# Patient Record
Sex: MALE | Race: WHITE | Employment: FULL TIME | URBAN - METROPOLITAN AREA
[De-identification: names, ages, dates, MRNs, and addresses within clinical notes are randomized per-mention and may not be internally consistent; named-entity substitution may affect disease eponyms.]

---

## 2017-04-28 ENCOUNTER — OFFICE VISIT (OUTPATIENT)
Dept: CARDIOLOGY CLINIC | Age: 57
End: 2017-04-28

## 2017-04-28 VITALS
HEIGHT: 70 IN | WEIGHT: 234 LBS | OXYGEN SATURATION: 95 % | HEART RATE: 94 BPM | DIASTOLIC BLOOD PRESSURE: 94 MMHG | BODY MASS INDEX: 33.5 KG/M2 | SYSTOLIC BLOOD PRESSURE: 154 MMHG | RESPIRATION RATE: 18 BRPM

## 2017-04-28 DIAGNOSIS — E78.5 DYSLIPIDEMIA: ICD-10-CM

## 2017-04-28 DIAGNOSIS — E78.2 MIXED HYPERLIPIDEMIA WITH APOLIPOPROTEIN E2 VARIANT: ICD-10-CM

## 2017-04-28 DIAGNOSIS — I25.10 CORONARY ARTERY DISEASE INVOLVING NATIVE CORONARY ARTERY OF NATIVE HEART WITHOUT ANGINA PECTORIS: ICD-10-CM

## 2017-04-28 DIAGNOSIS — I25.5 ISCHEMIC CARDIOMYOPATHY: ICD-10-CM

## 2017-04-28 DIAGNOSIS — F17.210 CIGARETTE NICOTINE DEPENDENCE WITHOUT COMPLICATION: ICD-10-CM

## 2017-04-28 DIAGNOSIS — E55.9 VITAMIN D DEFICIENCY: ICD-10-CM

## 2017-04-28 DIAGNOSIS — I77.9 CAROTID DISEASE, BILATERAL (HCC): Primary | ICD-10-CM

## 2017-04-28 DIAGNOSIS — I10 HTN (HYPERTENSION), BENIGN: ICD-10-CM

## 2017-04-28 RX ORDER — SILDENAFIL 100 MG/1
100 TABLET, FILM COATED ORAL AS NEEDED
Qty: 10 TAB | Refills: 4 | Status: SHIPPED | OUTPATIENT
Start: 2017-04-28

## 2017-04-28 NOTE — PATIENT INSTRUCTIONS
We are thankful that you are doing well and not having any chest pains or worsening symptoms, BUT your risk is still there to cause another heart attack or stroke. Please have your labs done in the near future - remember to fast (no food after midnight the night prior to having your labs done). I will call you with the results. Continue your current medications including Aspirin, Plavix, Lipitor. Look over the nutrition information I gave you last time to remind yourself again about the heart healthy diet and the simple carbs to avoid - this includes bread, rice, potatoes, pasta, beer, and sweets.

## 2017-04-28 NOTE — MR AVS SNAPSHOT
Visit Information Date & Time Provider Department Dept. Phone Encounter #  
 4/28/2017  2:40 PM Hong Ricketts MD [facility] Cheryl Go [phone] [id number] Follow-up Instructions Return in about 6 months (around 10/28/2017). Your Appointments 10/31/2017  3:20 PM  
ESTABLISHED PATIENT with Hong Ricketts MD  
[facility] ([facility]) Appt Note: 6 mo fup  
 354 [facility] [address]7 [facility]  
54 [facility] [address] [address] Upcoming Health Maintenance Date Due Hepatitis C Screening 1960 DTaP/Tdap/Td series (1 - Tdap) 11/11/1982 FOBT Q 1 YEAR AGE 50-75 11/11/2011 INFLUENZA AGE 9 TO ADULT 8/1/2016 Allergies as of 4/28/2017  Review Complete On: 4/28/2017 By: Kay Valentin NP Severity Noted Reaction Type Reactions Penicillins  11/25/2014   Intolerance Nausea and Vomiting Current Immunizations  Never Reviewed No immunizations on file. Not reviewed this visit You Were Diagnosed With   
  
 Codes Comments Carotid disease, bilateral ([facility] [address]    -  Primary ICD-10-CM: I77.9 ICD-9-CM: 447.9 Coronary artery disease involving native coronary artery of native heart without angina pectoris     ICD-10-CM: I25.10 ICD-9-CM: 414.01 Ischemic cardiomyopathy     ICD-10-CM: I25.5 ICD-9-CM: 414.8 HTN (hypertension), benign     ICD-10-CM: I10 
ICD-9-CM: 401.1 Cigarette nicotine dependence without complication     -93-EK: F17.210 ICD-9-CM: 305.1 Dyslipidemia     ICD-10-CM: E78.5 ICD-9-CM: 272.4 Mixed hyperlipidemia with apolipoprotein E2 variant     ICD-10-CM: E78.2 ICD-9-CM: 272.2 Vitamin D deficiency     ICD-10-CM: E55.9 ICD-9-CM: 268.9 Vitals BP Pulse Resp Height(growth percentile) Weight(growth percentile) SpO2 (!) 154/94 (BP 1 Location: Left arm, BP Patient Position: Sitting) 94 18 5' 10\" (1.778 m) 234 lb (106.1 kg) 95% BMI Smoking Status 33.58 kg/m2 Former Smoker BMI and BSA Data Body Mass Index Body Surface Area 33.58 kg/m 2 2.29 m 2 Preferred Pharmacy Pharmacy Name Phone 305 [facility], [address] [address] Your Updated Medication List  
  
   
This list is accurate as of: 4/28/17  3:25 PM.  Always use your most recent med list.  
  
  
  
  
 aspirin delayed-release 81 mg tablet Take  by mouth daily. atorvastatin 20 mg tablet Commonly known as:  LIPITOR Take 1 Tab by mouth daily. carvedilol 6.25 mg tablet Commonly known as:  Anitha Breed Take 1 Tab by mouth two (2) times daily (with meals). cholecalciferol (vitamin D3) 2,000 unit Tab Take  by mouth. clopidogrel 75 mg Tab Commonly known as:  PLAVIX Take 1 Tab by mouth daily. lisinopril 20 mg tablet Commonly known as:  Serge Moulder Take 1 tab po qd  
  
 omega-3 fatty acids-vitamin e 1,000 mg Cap Take 1 Cap by mouth.  
  
 sildenafil citrate 100 mg tablet Commonly known as:  VIAGRA Take 1 Tab by mouth as needed. TYLENOL EXTRA STRENGTH 500 mg tablet Generic drug:  acetaminophen Take 1,000 mg by mouth every six (6) hours as needed for Pain. Prescriptions Printed Refills  
 sildenafil citrate (VIAGRA) 100 mg tablet 4 Sig: Take 1 Tab by mouth as needed. Class: Print Route: Oral  
  
Follow-up Instructions Return in about 6 months (around 10/28/2017). Patient Instructions We are thankful that you are doing well and not having any chest pains or worsening symptoms, BUT your risk is still there to cause another heart attack or stroke.    
 
Please have your labs done in the near future - remember to fast (no food after midnight the night prior to having your labs done). I will call you with the results. Continue your current medications including Aspirin, Plavix, Lipitor. Look over the nutrition information I gave you last time to remind yourself again about the heart healthy diet and the simple carbs to avoid - this includes bread, rice, potatoes, pasta, beer, and sweets. Introducing [facility] & HEALTH SERVICES! [employer] introduces [employer] patient portal. Now you can access parts of your medical record, email your doctor's office, and request medication refills online. 1. In your internet browser, go to [email or web address] 2. Click on the First Time User? Click Here link in the Sign In box. You will see the New Member Sign Up page. 3. Enter your [employer] Access Code exactly as it appears below. You will not need to use this code after youve completed the sign-up process. If you do not sign up before the expiration date, you must request a new code. · [employer] Access Code: [email or web address] Expires: 7/27/2017  3:24 PM 
 
4. Enter the last four digits of your Social Security Number (xxxx) and Date of Birth (mm/dd/yyyy) as indicated and click Submit. You will be taken to the next sign-up page. 5. Create a [employer] ID. This will be your [employer] login ID and cannot be changed, so think of one that is secure and easy to remember. 6. Create a [employer] password. You can change your password at any time. 7. Enter your Password Reset Question and Answer. This can be used at a later time if you forget your password. 8. Enter your e-mail address. You will receive e-mail notification when new information is available in [address] 9. Click Sign Up. You can now view and download portions of your medical record. 10. Click the Download Summary menu link to download a portable copy of your medical information.  
 
If you have questions, please visit the Frequently Asked Questions section of the [employer]. Remember, [employer] is NOT to be used for urgent needs. For medical emergencies, dial 911. Now available from your iPhone and Android! Please provide this summary of care documentation to your next provider. Your primary care clinician is listed as Alexander Erickson. If you have any questions after today's visit, please call [phone].

## 2017-04-28 NOTE — PROGRESS NOTES
[id number] [facility], [address] [facility]    Office [phone]  Fax [phone]     Nicolas Qiu is a 55 y.o. male. Last seen 6 months ago. Assessment  Encounter Diagnoses   Name Primary?  Coronary artery disease involving native coronary artery of native heart without angina pectoris     Ischemic cardiomyopathy     HTN (hypertension), benign     Carotid disease, bilateral (HCC) Yes    Cigarette nicotine dependence without complication     Dyslipidemia     Mixed hyperlipidemia with apolipoprotein E2 variant     Vitamin D deficiency        Recommendations:  Mr. Melchor Pardo has CAD with history of inferior MI 2014 s/p PCI distal RCA with CARLOS. He had moderate LV dysfunction at the time of his cath with interval normalization in EF by repeat Echo 10/2016. He has no sxs of angina or HF at a good functional capacity. Continue DAPT given his residual risk factors. No bleeding or bruising concerns. Will consider repeat Echo and carotid duplex following his exam next office visit with insurance coverage in mind. BP remains elevated in the office today. He is unable to purchase a home cuff, but monitoring at the drug store shows normal BP's 846-895'S systolic. Will continue current regimen. We had a long discussion about the importance of low sodium diet. He reports compliance with statin therapy, however repeat NMR and CMP were not drawn a requested 6 months ago. Lab slips were re-printed and he was encouraged to have then drawn (fasting) in the near future. Phone follow up to review results. We had a long discussion about risk factor modifications. Discussed the importance of smoking cessation; asked him to work on cutting back. Encouraged him to become active outside of his work environment. Discussed a goal of 10 pounds of weight loss prior to his next visit in 6 months. He does not seem completely motivated to change at this time.       The patient was encouraged to continue current medications and to call with any new complaints or concerns. Follow-up Disposition:  Return in about 6 months (around 10/28/2017). Subjective:  Mr. Kirk Portillo denies any significant interval issues. He remains active with working at a [employer]. He denies any exertional chest pain, dyspnea, or palpitations. He continues to smoke a pack a day. He notes chronic congestion, mild cough and intermittent wheezing. No orthopnea or PND sxs. His diet is unchanged - following a diet high in processed foods and simple carbs. He has gained weight since his last visit. He voices concern that he will lose his job in the next few months and insurance coverage may become an issue. Cardiac risk factors   HTN yes  DM no  Smoking yes   Family hx of CAD no    Cardiac testing  Echo 11/25/14 - EF 40%, inferior and inferolateral HK    Exercise cardiolite 12/91/14 - inferior and inferolateral infarction, he went 7 minutes with partial reversibility involving the inferolateral wall. LVEF 48% with LV dilation. SSS 25. SRS 14     Cath 12/30/2014 - EDP 13, dilated LV, inferior AK, EF 35-40%, LM distal mild plaque, LAD distal 70%, D1 prox 80%, LCX mild plaque, RCA diffuse disease, mid 95%, distal 75%, crux 80%, faint L to R collaterals. PCI 12/30/14 - CARLOS mid-distal RCA: 2.75x28 Xience (distally) overlapping with 2.75x38 Xience (mid). Carotid duplex: 7/93/89 - MALOU 93-20%, LICA 57-78%  Carotid duplex 10/25/16 - 10-49% bilaterally, unchanged. Echo 10/25/16 - LVEF 55-60%, basal inferior HK    Current Outpatient Prescriptions on File Prior to Visit   Medication Sig Dispense Refill    clopidogrel (PLAVIX) 75 mg tablet Take 1 Tab by mouth daily. 90 Tab 3    atorvastatin (LIPITOR) 20 mg tablet Take 1 Tab by mouth daily. 90 Tab 3    carvedilol (COREG) 6.25 mg tablet Take 1 Tab by mouth two (2) times daily (with meals).  180 Tab 3    lisinopril (PRINIVIL, ZESTRIL) 20 mg tablet Take 1 tab po qd 90 Tab 3    omega-3 fatty acids-vitamin e 1,000 mg cap Take 1 Cap by mouth.  cholecalciferol, vitamin D3, 2,000 unit tab Take  by mouth.  aspirin delayed-release 81 mg tablet Take  by mouth daily.  acetaminophen (TYLENOL EXTRA STRENGTH) 500 mg tablet Take 1,000 mg by mouth every six (6) hours as needed for Pain.  sildenafil citrate (VIAGRA) 100 mg tablet Take 1 Tab by mouth as needed. 10 Tab 3     No current facility-administered medications on file prior to visit. Allergies   Allergen Reactions    Penicillins Nausea and Vomiting      He works building structural steel. Lives at home with his son in [place]. Review of Systems  Constitutional: Negative for fever, chills, malaise/fatigue and diaphoresis. Respiratory: Negative for hemoptysis. Positive for cough, sputum production, shortness of breath and wheezing. Cardiovascular: Negative for chest pain, palpitations, orthopnea, claudication, leg swelling and PND. Gastrointestinal: Negative for heartburn, nausea, vomiting, blood in stool and melena. Genitourinary: Negative for dysuria and flank pain. Musculoskeletal: Negative for joint pain and back pain. Skin: Negative for rash. Neurological: Negative for focal weakness, seizures, loss of consciousness, weakness and headaches. Endo/Heme/Allergies: Does not bruise/bleed easily. Psychiatric/Behavioral: Negative for memory loss. The patient does not have insomnia. Visit Vitals    BP (!) 154/94 (BP 1 Location: Left arm, BP Patient Position: Sitting)    Pulse 94    Resp 18    Ht 5' 10\" (1.778 m)    Wt 234 lb (106.1 kg)    SpO2 95%    BMI 33.58 kg/m2      Wt Readings from Last 3 Encounters:   04/28/17 234 lb (106.1 kg)   10/25/16 230 lb (104.3 kg)   07/18/16 223 lb (101.2 kg)     Physical Exam  General - well developed well nourished  Neck - JVP normal, thyroid normal  Cardiac - normal S1,S2, no murmurs, rubs or gallops.  No clicks  Vascular - carotids without bruits, radials, femorals and pedal pulses equal bilateral  Lungs - clear to auscultation bilaterals, no rales or rhonchi. Faint expiratory wheeze.    Abd - obese,  soft nontender, no HSM  Extremities - no edema  Skin - no rash  Neuro - nonfocal  Psych - normal mood and affect    Cardiographics  ECG 11/25/14 - sinus rhythm, inferior MI, inferior T wave inversions  EKG 1/26/15 - sinus rhythm, inferior MI, unchanged from previous tracing   EKG 7/18/16 - SR 91, normal    Gutierrez Ranks, NP  Patricia Cifuentes MD

## 2017-09-18 RX ORDER — LISINOPRIL 20 MG/1
TABLET ORAL
Qty: 90 TAB | Refills: 3 | Status: SHIPPED | OUTPATIENT
Start: 2017-09-18 | End: 2017-12-29 | Stop reason: SDUPTHER

## 2017-09-18 RX ORDER — ATORVASTATIN CALCIUM 20 MG/1
TABLET, FILM COATED ORAL
Qty: 20 TAB | Refills: 0 | Status: SHIPPED | OUTPATIENT
Start: 2017-09-18 | End: 2017-12-29 | Stop reason: SDUPTHER

## 2017-09-18 RX ORDER — CLOPIDOGREL BISULFATE 75 MG/1
TABLET ORAL
Qty: 90 TAB | Refills: 3 | Status: SHIPPED | OUTPATIENT
Start: 2017-09-18 | End: 2017-12-29 | Stop reason: SDUPTHER

## 2017-12-29 ENCOUNTER — OFFICE VISIT (OUTPATIENT)
Dept: CARDIOLOGY CLINIC | Age: 57
End: 2017-12-29

## 2017-12-29 VITALS
SYSTOLIC BLOOD PRESSURE: 146 MMHG | DIASTOLIC BLOOD PRESSURE: 96 MMHG | WEIGHT: 196 LBS | HEART RATE: 80 BPM | RESPIRATION RATE: 18 BRPM | BODY MASS INDEX: 28.12 KG/M2 | OXYGEN SATURATION: 98 %

## 2017-12-29 DIAGNOSIS — I77.9 CAROTID DISEASE, BILATERAL (HCC): ICD-10-CM

## 2017-12-29 DIAGNOSIS — I10 HTN (HYPERTENSION), BENIGN: ICD-10-CM

## 2017-12-29 DIAGNOSIS — I25.10 CORONARY ARTERY DISEASE INVOLVING NATIVE CORONARY ARTERY OF NATIVE HEART WITHOUT ANGINA PECTORIS: Primary | ICD-10-CM

## 2017-12-29 DIAGNOSIS — E78.5 DYSLIPIDEMIA: ICD-10-CM

## 2017-12-29 DIAGNOSIS — I25.5 ISCHEMIC CARDIOMYOPATHY: ICD-10-CM

## 2017-12-29 DIAGNOSIS — E78.2 MIXED HYPERLIPIDEMIA WITH APOLIPOPROTEIN E2 VARIANT: ICD-10-CM

## 2017-12-29 DIAGNOSIS — F17.210 CIGARETTE NICOTINE DEPENDENCE WITHOUT COMPLICATION: ICD-10-CM

## 2017-12-29 RX ORDER — CARVEDILOL 6.25 MG/1
6.25 TABLET ORAL 2 TIMES DAILY WITH MEALS
Qty: 180 TAB | Refills: 3 | Status: SHIPPED | OUTPATIENT
Start: 2017-12-29 | End: 2018-02-09 | Stop reason: SDUPTHER

## 2017-12-29 RX ORDER — LISINOPRIL 20 MG/1
TABLET ORAL
Qty: 90 TAB | Refills: 3 | Status: SHIPPED | OUTPATIENT
Start: 2017-12-29 | End: 2018-02-09 | Stop reason: SDUPTHER

## 2017-12-29 RX ORDER — ATORVASTATIN CALCIUM 20 MG/1
TABLET, FILM COATED ORAL
Qty: 90 TAB | Refills: 3 | Status: SHIPPED | OUTPATIENT
Start: 2017-12-29 | End: 2018-02-09 | Stop reason: SDUPTHER

## 2017-12-29 RX ORDER — CLOPIDOGREL BISULFATE 75 MG/1
75 TABLET ORAL DAILY
Qty: 90 TAB | Refills: 3 | Status: SHIPPED | OUTPATIENT
Start: 2017-12-29 | End: 2018-02-09 | Stop reason: SDUPTHER

## 2017-12-29 NOTE — PATIENT INSTRUCTIONS
Purchase a Blood Pressure monitor    Measure your Blood Pressure daily at home    Get fasting cholesterol labs in the near future    Ideal Blood Pressure is 120-130/80

## 2017-12-29 NOTE — PROGRESS NOTES
[id number]  [place], [address] [facility]    Office [phone]  Fax [phone]     Breanne Ritchie is a 65 y.o. male. Last seen 8 months ago. Assessment  Encounter Diagnoses   Name Primary?  Coronary artery disease involving native coronary artery of native heart without angina pectoris Yes    Mixed hyperlipidemia with apolipoprotein E2 variant     Ischemic cardiomyopathy     Dyslipidemia     HTN (hypertension), benign     Carotid disease, bilateral (HCC)     Cigarette nicotine dependence without complication        Recommendations:  Mr. Yris Altamirano has CAD with history of inferior MI 2014 s/p PCI distal RCA with CARLOS. LVEF by Echo 1 year ago was normal. He has no sxs of angina or HF at a good functional capacity. Continue DAPT given his residual risk factors. BP remains elevated in the office today, uncertain control at home. Urged him to purchase a home BP cuff. We had a long discussion about the importance of low sodium diet. Update lipids with NMR in the near future. Phone follow up. We discussed the importance of TLC and smoking cessation. Follow-up Disposition:  Return in about 6 months (around 6/29/2018). Subjective:  Mr. Yris Altamirano denies any significant interval issues. He remains active with working full time at a [employer]. He denies any exertional chest pain, dyspnea, or palpitations. Patient is taking an Aspirin 81 mg daily. He has run out of his cholesterol medication for a couple of days. He continues to smoke a half pack a day. He is not checking his blood pressure outside of the office. No orthopnea or PND sxs. His diet is unchanged - following a diet high in processed foods and simple carbs. He has gained weight since his last visit.       Cardiac risk factors   HTN yes  DM no  Smoking yes   Family hx of CAD no    Cardiac testing  Echo 11/25/14 - EF 40%, inferior and inferolateral HK    Exercise cardiolite 12/91/14 - inferior and inferolateral infarction, he went 7 minutes with partial reversibility involving the inferolateral wall. LVEF 48% with LV dilation. SSS 25. SRS 14     Cath 12/30/2014 - EDP 13, dilated LV, inferior AK, EF 35-40%, LM distal mild plaque, LAD distal 70%, D1 prox 80%, LCX mild plaque, RCA diffuse disease, mid 95%, distal 75%, crux 80%, faint L to R collaterals. PCI 12/30/14 - CARLOS mid-distal RCA: 2.75x28 Xience (distally) overlapping with 2.75x38 Xience (mid). Carotid duplex: 4/64/29 - MALOU 49-54%, LICA 75-30%  Carotid duplex 10/25/16 - 10-49% bilaterally, unchanged. Echo 10/25/16 - LVEF 55-60%, basal inferior HK    Current Outpatient Prescriptions on File Prior to Visit   Medication Sig Dispense Refill    sildenafil citrate (VIAGRA) 100 mg tablet Take 1 Tab by mouth as needed. 10 Tab 4    omega-3 fatty acids-vitamin e 1,000 mg cap Take 1 Cap by mouth.  cholecalciferol, vitamin D3, 2,000 unit tab Take  by mouth.  acetaminophen (TYLENOL EXTRA STRENGTH) 500 mg tablet Take 1,000 mg by mouth every six (6) hours as needed for Pain.  aspirin delayed-release 81 mg tablet Take  by mouth daily. No current facility-administered medications on file prior to visit. 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 Review of Systems  Constitutional: Negative for fever, chills, malaise/fatigue and diaphoresis. Respiratory: Negative for hemoptysis. Positive for cough, sputum production, shortness of breath and wheezing. Cardiovascular: Negative for chest pain, palpitations, orthopnea, claudication, leg swelling and PND. Gastrointestinal: Negative for heartburn, nausea, vomiting, blood in stool and melena. Genitourinary: Negative for dysuria and flank pain. Musculoskeletal: Negative for joint pain and back pain. Skin: Negative for rash.    Neurological: Negative for focal weakness, seizures, loss of consciousness, weakness and headaches. Endo/Heme/Allergies: Does not bruise/bleed easily. Psychiatric/Behavioral: Negative for memory loss. The patient does not have insomnia. Visit Vitals    BP (!) 146/96    Pulse 80    Resp 18    Wt 196 lb (88.9 kg)    SpO2 98%    BMI 28.12 kg/m2      Wt Readings from Last 3 Encounters:   12/29/17 196 lb (88.9 kg)   04/28/17 234 lb (106.1 kg)   10/25/16 230 lb (104.3 kg)     Physical Exam  General - well developed well nourished  Neck - JVP normal, thyroid normal  Cardiac - normal S1,S2, no murmurs, rubs or gallops. No clicks  Vascular - carotids without bruits, radials, femorals and pedal pulses equal bilateral  Lungs - clear to auscultation bilaterals, no rales or rhonchi. Faint expiratory wheeze.    Abd - obese,  soft nontender, no HSM  Extremities - no edema  Skin - no rash  Neuro - nonfocal  Psych - normal mood and affect    Cardiographics  ECG 11/25/14 - sinus rhythm, inferior MI, inferior T wave inversions  EKG 1/26/15 - sinus rhythm, inferior MI, unchanged from previous tracing   EKG 7/18/16 - SR 91, normal  EKG 12/29/17 - SR, with 1 blocked PAC    Written by Nakia Jerome, as dictated by Tree Beal MD.    Tree Beal MD

## 2017-12-29 NOTE — MR AVS SNAPSHOT
Visit Information Date & Time Provider Department Dept. Phone Encounter #  
 12/29/2017  4:20 PM Romaine Carter MD [facility] [place] [phone] [id number] Follow-up Instructions Return in about 6 months (around 6/29/2018). Upcoming Health Maintenance Date Due Hepatitis C Screening 1960 DTaP/Tdap/Td series (1 - Tdap) 11/11/1982 FOBT Q 1 YEAR AGE 50-75 11/11/2011 Influenza Age 5 to Adult 8/1/2017 Allergies as of 12/29/2017  Review Complete On: 4/28/2017 By: Sami Mullen NP Severity Noted Reaction Type Reactions Penicillins  11/25/2014   Intolerance Nausea and Vomiting Current Immunizations  Never Reviewed No immunizations on file. Not reviewed this visit You Were Diagnosed With   
  
 Codes Comments Coronary artery disease involving native coronary artery of native heart without angina pectoris    -  Primary ICD-10-CM: I25.10 ICD-9-CM: 414.01 Mixed hyperlipidemia with apolipoprotein E2 variant     ICD-10-CM: E78.2 ICD-9-CM: 272.2 Ischemic cardiomyopathy     ICD-10-CM: I25.5 ICD-9-CM: 414.8 Dyslipidemia     ICD-10-CM: E78.5 ICD-9-CM: 272.4 HTN (hypertension), benign     ICD-10-CM: I10 
ICD-9-CM: 401.1 Carotid disease, bilateral ([address]     ICD-10-CM: I77.9 ICD-9-CM: 706. 9 Cigarette nicotine dependence without complication     RSJ-84-BQ: F17.210 ICD-9-CM: 305.1 Vitals BP Pulse Resp Weight(growth percentile) SpO2 BMI  
 (!) 146/96 80 18 196 lb (88.9 kg) 98% 28.12 kg/m2 Smoking Status Former Smoker BMI and BSA Data Body Mass Index Body Surface Area  
 28.12 kg/m 2 2.1 m 2 Preferred Pharmacy Pharmacy Name Phone 305 [facility], [address] [place] [address] [address] Adilia [address] 134 Your Updated Medication List  
  
   
This list is accurate as of: 12/29/17  4:58 PM.  Always use your most recent med list.  
  
  
  
 aspirin delayed-release 81 mg tablet Take  by mouth daily. atorvastatin 20 mg tablet Commonly known as:  LIPITOR Take 1 tablet by mouth daily. FASTING LABS REQUIRED. carvedilol 6.25 mg tablet Commonly known as:  Lavone Jimy Take 1 Tab by mouth two (2) times daily (with meals). cholecalciferol (vitamin D3) 2,000 unit Tab Take  by mouth. clopidogrel 75 mg Tab Commonly known as:  PLAVIX Take 1 tablet by mouth  daily  
  
 lisinopril 20 mg tablet Commonly known as:  Lizeth Best Take 1 tablet by mouth  every day  
  
 omega-3 fatty acids-vitamin e 1,000 mg Cap Take 1 Cap by mouth.  
  
 sildenafil citrate 100 mg tablet Commonly known as:  VIAGRA Take 1 Tab by mouth as needed. TYLENOL EXTRA STRENGTH 500 mg tablet Generic drug:  acetaminophen Take 1,000 mg by mouth every six (6) hours as needed for Pain. We Performed the Following AMB POC EKG ROUTINE W/ 12 LEADS, INTER & REP [46983 CPT(R)] METABOLIC PANEL, COMPREHENSIVE [52208 CPT(R)] NMR LIPOPROFILE [id number] CPT(R)] Follow-up Instructions Return in about 6 months (around 6/29/2018). Patient Instructions Purchase a Blood Pressure monitor Measure your Blood Pressure daily at home Get fasting cholesterol labs in the near future Ideal Blood Pressure is 120-130/80 Introducing \[facility]\"" & HEALTH SERVICES! [facility] introduces [employer] patient portal. Now you can access parts of your medical record, email your doctor's office, and request medication refills online. 1. In your internet browser, go to [email or web address] ([email or web address] 2. Click on the First Time User? Click Here link in the Sign In box. You will see the New Member Sign Up page. 3. Enter your [employer] Access Code exactly as it appears below. You will not need to use this code after youve completed the sign-up process.  If you do not sign up before the expiration date, you must request a new code. 
 
· [employer] Access Code: [email or web address] Expires: 3/29/2018  4:57 PM 
 
4. Enter the last four digits of your Social Security Number (xxxx) and Date of Birth (mm/dd/yyyy) as indicated and click Submit. You will be taken to the next sign-up page. 5. Create a [employer] ID. This will be your [employer] login ID and cannot be changed, so think of one that is secure and easy to remember. 6. Create a [employer] password. You can change your password at any time. 7. Enter your Password Reset Question and Answer. This can be used at a later time if you forget your password. 8. Enter your e-mail address. You will receive e-mail notification when new information is available in [address] 9. Click Sign Up. You can now view and download portions of your medical record. 10. Click the Download Summary menu link to download a portable copy of your medical information. If you have questions, please visit the Frequently Asked Questions section of the [employer] website. Remember, [employer] is NOT to be used for urgent needs. For medical emergencies, dial 911. Now available from your iPhone and Android! Please provide this summary of care documentation to your next provider. Your primary care clinician is listed as Alden Jo. If you have any questions after today's visit, please call [phone].

## 2018-01-31 RX ORDER — CLOPIDOGREL BISULFATE 75 MG/1
TABLET ORAL
Qty: 15 TAB | Refills: 0 | OUTPATIENT
Start: 2018-01-31

## 2018-01-31 RX ORDER — ATORVASTATIN CALCIUM 20 MG/1
TABLET, FILM COATED ORAL
Qty: 15 TAB | Refills: 0 | OUTPATIENT
Start: 2018-01-31

## 2018-01-31 RX ORDER — CARVEDILOL 6.25 MG/1
TABLET ORAL
Qty: 30 TAB | Refills: 0 | OUTPATIENT
Start: 2018-01-31

## 2018-01-31 RX ORDER — LISINOPRIL 20 MG/1
TABLET ORAL
Qty: 15 TAB | Refills: 0 | OUTPATIENT
Start: 2018-01-31

## 2018-02-09 NOTE — TELEPHONE ENCOUNTER
Requested Prescriptions     Pending Prescriptions Disp Refills    atorvastatin (LIPITOR) 20 mg tablet 90 Tab 3     Sig: Take 1 tablet by mouth daily. FASTING LABS REQUIRED.  carvedilol (COREG) 6.25 mg tablet 180 Tab 3     Sig: Take 1 Tab by mouth two (2) times daily (with meals).  clopidogrel (PLAVIX) 75 mg tab 90 Tab 3     Sig: Take 1 Tab by mouth daily.     lisinopril (PRINIVIL, ZESTRIL) 20 mg tablet 90 Tab 3     Sig: Take 1 tablet by mouth  every day     Last OV 12/29/17  Next OV 7/2/18  Pt has been out of this medication for a week    Pharmacy verified    Thank you, MARY

## 2018-02-12 RX ORDER — ATORVASTATIN CALCIUM 20 MG/1
TABLET, FILM COATED ORAL
Qty: 30 TAB | Refills: 0 | Status: SHIPPED | OUTPATIENT
Start: 2018-02-12

## 2018-02-12 RX ORDER — CARVEDILOL 6.25 MG/1
6.25 TABLET ORAL 2 TIMES DAILY WITH MEALS
Qty: 180 TAB | Refills: 3 | Status: SHIPPED | OUTPATIENT
Start: 2018-02-12

## 2018-02-12 RX ORDER — LISINOPRIL 20 MG/1
TABLET ORAL
Qty: 90 TAB | Refills: 3 | Status: SHIPPED | OUTPATIENT
Start: 2018-02-12

## 2018-02-12 RX ORDER — CLOPIDOGREL BISULFATE 75 MG/1
75 TABLET ORAL DAILY
Qty: 90 TAB | Refills: 3 | Status: SHIPPED | OUTPATIENT
Start: 2018-02-12

## 2018-02-13 RX ORDER — CARVEDILOL 6.25 MG/1
TABLET ORAL
Qty: 30 TAB | Refills: 0 | OUTPATIENT
Start: 2018-02-13

## 2018-02-13 RX ORDER — LISINOPRIL 20 MG/1
TABLET ORAL
Qty: 15 TAB | Refills: 0 | OUTPATIENT
Start: 2018-02-13

## 2018-02-13 RX ORDER — CLOPIDOGREL BISULFATE 75 MG/1
TABLET ORAL
Qty: 15 TAB | Refills: 0 | OUTPATIENT
Start: 2018-02-13

## 2018-02-13 RX ORDER — ATORVASTATIN CALCIUM 20 MG/1
TABLET, FILM COATED ORAL
Qty: 15 TAB | Refills: 0 | OUTPATIENT
Start: 2018-02-13